# Patient Record
Sex: FEMALE | Race: WHITE | NOT HISPANIC OR LATINO | Employment: FULL TIME | URBAN - METROPOLITAN AREA
[De-identification: names, ages, dates, MRNs, and addresses within clinical notes are randomized per-mention and may not be internally consistent; named-entity substitution may affect disease eponyms.]

---

## 2022-06-28 ENCOUNTER — OFFICE VISIT (OUTPATIENT)
Dept: PHYSICAL THERAPY | Facility: MEDICAL CENTER | Age: 67
End: 2022-06-28
Payer: COMMERCIAL

## 2022-06-28 DIAGNOSIS — M79.651 ACUTE THIGH PAIN, RIGHT: Primary | ICD-10-CM

## 2022-06-28 PROCEDURE — 97161 PT EVAL LOW COMPLEX 20 MIN: CPT | Performed by: PHYSICAL THERAPIST

## 2022-06-28 PROCEDURE — 97140 MANUAL THERAPY 1/> REGIONS: CPT | Performed by: PHYSICAL THERAPIST

## 2022-06-28 PROCEDURE — 97110 THERAPEUTIC EXERCISES: CPT | Performed by: PHYSICAL THERAPIST

## 2022-06-28 NOTE — PROGRESS NOTES
PT Evaluation     Today's date: 2022  Patient name: Blanka Alvarez  : 1955  MRN: [id number]  Referring provider: Lino Villavicencio PT  Dx:   Encounter Diagnosis   Name Primary?  Acute thigh pain, right Yes                  Assessment  Assessment details: Blanka Alvarez is a 75 y/o female who presents with complaints of right thigh pain  The patient's greatest concern is pain in the morning with walking  Primary movement impairment diagnosis of pelvic malalignment, resulting in pathoanatomical symptoms of acute thigh pain, which limits her ability to perform functional activities without pain  Pt  will benefit from skilled PT services that includes manual therapy techniques to enhance tissue extensibility, neuromuscular re-education to facilitate motor control, therapeutic exercise to increase functional mobility, and modalities prn to reduce pain and inflammation  Impairments: abnormal muscle firing, abnormal or restricted ROM, activity intolerance, impaired physical strength, lacks appropriate home exercise program and pain with function  Understanding of Dx/Px/POC: good   Prognosis: good    Plan  Plan details: Patient will be seen [facility] for 4 weeks  No referral appears necessary at this time  Patient would benefit from: PT eval  Planned modality interventions: thermotherapy: hydrocollator packs  Planned therapy interventions: joint mobilization, manual therapy, patient education, strengthening, stretching, therapeutic exercise, home exercise program, graded exercise, flexibility, body mechanics training and abdominal trunk stabilization  Frequency: 1-2x/week  Duration in weeks: 4  Treatment plan discussed with: patient      Subjective Evaluation    History of Present Illness  Mechanism of injury: Patient reports that in early May, she woke up in the morning and her right thigh started to become painful with walking  She started taking Advil, which helped to alleviate the pain    She does not recall a specific MORGAN  The pain has gotten better since May, but is still bothersome at times  Patient is fairly active and likes to golf on a regular basis  She also bikes 2-3x/week  She denies back pain and LE paresthesias  Patient has a desk job as a supervisor at [employer]  She notes some soreness after sitting throughout the day and upon standing  Patient would like to be able to resume all activities without pain  Pain  Current pain ratin  At best pain ratin  At worst pain rating: 3  Quality: throbbing and sharp      Diagnostic Tests  No diagnostic tests performed  Patient Goals  Patient goals for therapy: decreased pain  Patient goal: Patient would like to be able to walk without a limp in the morning  Objective     Palpation     Right   No palpable tenderness to the rectus femoris  Hypertonic in the iliopsoas  Tenderness of the iliopsoas  Tenderness     Left Hip   No tenderness in the PSIS  Right Hip   No tenderness in the ASIS, PSIS and greater trochanter  Lumbar Screen  Lumbar range of motion within normal limits with the following exceptions: Moderate lumbar extension limitation c/ pain      Mild L lateral shift    PAROM:  Hypomobility at L4-5 on the left c/ UPAs; no pain    Neurological Testing     Sensation     Hip   Left Hip   Intact: light touch    Right Hip   Intact: light touch    Reflexes   Left   Patellar (L4): normal (2+)  Achilles (S1): normal (2+)    Right   Patellar (L4): normal (2+)  Achilles (S1): normal (2+)    Passive Range of Motion   Left Hip   Flexion: 120 degrees   Abduction: 30 degrees   External rotation (90/90): 35 degrees   Internal rotation (90/90): 20 degrees     Right Hip   Flexion: 110 degrees   Abduction: 30 degrees   External rotation (90/90): 35 degrees   Internal rotation (90/90): 15 degrees     Joint Play     Right Hip     Hypomobile in the posterior hip capsule    Strength/Myotome Testing     Left Hip   Planes of Motion   Flexion: 5  Extension: 4  Abduction: 4  Adduction: 4    Isolated Muscles   Gluteus danie: 4  Iliopsoas: 4    Right Hip   Planes of Motion   Flexion: 5  Extension: 3-  Abduction: 3+  Adduction: 4-    Isolated Muscles   Gluteus maximums: 3+  Iliopsoas: 4-    Tests     Lumbar   Negative SIJ compression, sacroiliac distraction and sacral spring   Right   Negative passive SLR, quadrant and slump test      Right Hip   Positive Ely's, Gillet's and long sit  Negative SID, FADIR, femoral nerve tension and sign of the buttock  Modified Kaushik: Positive  Additional Tests Details  (+) leg length discrepancy c/ R LE longer    Functional Assessment      Squat    Left within functional limits and right within functional limits       Posterior weight shift: good    Depth of femur height: above 90 degrees       Precautions:  N/A    Manuals 6/28            R SIJ posterior mobilization AZ Grade V            Long axis distraction L LE AZ            Posterior hip mob R AZ            Inferior glide/ IR MWM R hip AZ                         Neuro Re-Ed             TA isometrics 20x5s                                      Ther Ex             R lateral shift (L UE) 10x            Hip add 20x5s            Bridges 20x5s                         Prone quad str 3x30s            Standing hip flexor str 3x30s                                      Ther Activity                                       Gait Training                                       Modalities             GILLES Carrera, PT  6/29/2022,11:28 AM

## 2022-07-05 ENCOUNTER — OFFICE VISIT (OUTPATIENT)
Dept: PHYSICAL THERAPY | Facility: MEDICAL CENTER | Age: 67
End: 2022-07-05
Payer: COMMERCIAL

## 2022-07-05 DIAGNOSIS — M79.651 ACUTE THIGH PAIN, RIGHT: Primary | ICD-10-CM

## 2022-07-05 PROCEDURE — 97110 THERAPEUTIC EXERCISES: CPT | Performed by: PHYSICAL THERAPIST

## 2022-07-05 PROCEDURE — 97140 MANUAL THERAPY 1/> REGIONS: CPT | Performed by: PHYSICAL THERAPIST

## 2022-07-05 NOTE — PROGRESS NOTES
Daily Note     Today's date: 2022  Patient name: Benjamín Hutchins  : 1955  MRN: [id number]  Referring provider: Lisa Green, PT  Dx:   Encounter Diagnosis     ICD-10-CM    1  Acute thigh pain, right  M79 651                   Subjective:  Patient states that her R leg was bothering her when golfing this weekend and after walking a lot  Objective: See treatment diary below  Assessment:  Patient demonstrates improved pelvic alignment  She has hypomobility at L4-5 on the left  Tolerated prone press ups without increased pain  Patient demonstrated decreased pain c/ ambulation post tx  Tolerated treatment well  Patient would benefit from continued PT  Plan: Continue per plan of care        Precautions:  N/A    Manuals            R SIJ posterior mobilization AZ Grade V AZ           Long axis distraction L LE AZ AZ           Posterior hip mob R AZ AZ           Inferior glide/ IR MWM R hip AZ AZ                        Neuro Re-Ed             TA isometrics 20x5s 30x5s                                     Ther Ex             R lateral shift (L UE) 10x 10x           Hip add 20x5s 30x5s           Bridges 20x5s 20x5s           Hip abd  20x5s blue           Prone quad str 3x30s 3x30s           Standing hip flexor str 3x30s 3x30s           Hip flexor str EOT  3x30s           Prone press ups  10x           Ther Activity                                       Gait Training                                       Modalities             MH

## 2022-07-11 ENCOUNTER — OFFICE VISIT (OUTPATIENT)
Dept: PHYSICAL THERAPY | Facility: MEDICAL CENTER | Age: 67
End: 2022-07-11
Payer: COMMERCIAL

## 2022-07-11 DIAGNOSIS — M79.651 ACUTE THIGH PAIN, RIGHT: Primary | ICD-10-CM

## 2022-07-11 PROCEDURE — 97140 MANUAL THERAPY 1/> REGIONS: CPT | Performed by: PHYSICAL THERAPIST

## 2022-07-11 PROCEDURE — 97110 THERAPEUTIC EXERCISES: CPT | Performed by: PHYSICAL THERAPIST

## 2022-07-11 NOTE — PROGRESS NOTES
Daily Note     Today's date: 2022  Patient name: Sumaya Duarte  : 1955  MRN: [id number]  Referring provider: Nae Munoz, PALOMA  Dx:   Encounter Diagnosis     ICD-10-CM    1  Acute thigh pain, right  M79 651                   Subjective:  Patient states that she feels good  Objective: See treatment diary below  Assessment:  Patient presents without radiating thigh pain today and without a visible limp with walking  She demonstrates improved pelvic alignment and good tolerance to exercise progressions  Tolerated treatment well  Patient would benefit from continued PT  Plan: Continue per plan of care        Precautions:  N/A    Manuals           R SIJ posterior mobilization AZ Grade V AZ AZ          Long axis distraction L LE AZ AZ AZ          Posterior hip mob R AZ AZ AZ          Inferior glide/ IR MWM R hip AZ AZ AZ                       Neuro Re-Ed             TA isometrics 20x5s 30x5s 30x5s                                    Ther Ex             R lateral shift (L UE) 10x 10x 10x          Hip add 20x5s 30x5s 30x5s          Bridges 20x5s 20x5s 30x5s          Hip abd  20x5s blue 30x5s blue          Prone quad str 3x30s 3x30s 3x30s          Standing hip flexor str 3x30s 3x30s 3x30s          Hip flexor str EOT  3x30s 3x30s          Prone press ups  10x 10x          Ther Activity                                       Gait Training                                       Modalities

## 2022-07-14 ENCOUNTER — OFFICE VISIT (OUTPATIENT)
Dept: PHYSICAL THERAPY | Facility: MEDICAL CENTER | Age: 67
End: 2022-07-14
Payer: COMMERCIAL

## 2022-07-14 DIAGNOSIS — M79.651 ACUTE THIGH PAIN, RIGHT: Primary | ICD-10-CM

## 2022-07-14 PROCEDURE — 97110 THERAPEUTIC EXERCISES: CPT | Performed by: PHYSICAL THERAPIST

## 2022-07-14 PROCEDURE — 97140 MANUAL THERAPY 1/> REGIONS: CPT | Performed by: PHYSICAL THERAPIST

## 2022-07-14 NOTE — PROGRESS NOTES
Daily Note     Today's date: 2022  Patient name: Ion Joshi  : 1955  MRN: [id number]  Referring provider: Pia Mckeon PT  Dx:   Encounter Diagnosis     ICD-10-CM    1  Acute thigh pain, right  M79 651                   Subjective:  Patient states that she is doing much better  Objective: See treatment diary below  Assessment:  Patient demonstrates increased lumbar mobility and good pelvic alignment  She demonstrates good tolerance to exercise progressions without pain  Tolerated treatment well  Patient would benefit from continued PT  Plan: Continue per plan of care        Precautions:  N/A    Manuals          R SIJ posterior mobilization AZ Grade V AZ AZ AZ         Long axis distraction L LE AZ AZ AZ          Posterior hip mob R AZ AZ AZ AZ         Inferior glide/ IR MWM R hip AZ AZ AZ AZ                      Neuro Re-Ed             TA isometrics 20x5s 30x5s 30x5s 30x5s                                   Ther Ex             R lateral shift (L UE) 10x 10x 10x 10x         Hip add 20x5s 30x5s 30x5s 30x5s         Bridges 20x5s 20x5s 30x5s 30x5s         Hip abd  20x5s blue 30x5s blue [id number]         Prone quad str 3x30s 3x30s 3x30s 3x30s         Standing hip flexor str 3x30s 3x30s 3x30s 3x30s         Hip flexor str EOT  3x30s 3x30s 3x30s         Prone press ups  10x 10x 10x         Ther Activity                                       Gait Training                                       Modalities             MH

## 2022-07-20 ENCOUNTER — APPOINTMENT (OUTPATIENT)
Dept: PHYSICAL THERAPY | Facility: MEDICAL CENTER | Age: 67
End: 2022-07-20
Payer: COMMERCIAL

## 2022-07-22 ENCOUNTER — OFFICE VISIT (OUTPATIENT)
Dept: PHYSICAL THERAPY | Facility: MEDICAL CENTER | Age: 67
End: 2022-07-22
Payer: COMMERCIAL

## 2022-07-22 DIAGNOSIS — M79.651 ACUTE THIGH PAIN, RIGHT: Primary | ICD-10-CM

## 2022-07-22 PROCEDURE — 97110 THERAPEUTIC EXERCISES: CPT | Performed by: PHYSICAL THERAPIST

## 2022-07-22 PROCEDURE — 97140 MANUAL THERAPY 1/> REGIONS: CPT | Performed by: PHYSICAL THERAPIST

## 2022-07-22 NOTE — PROGRESS NOTES
Daily Note     Today's date: 2022  Patient name: Sonja Washington  : 1955  MRN: [id number]  Referring provider: Pavan Rios, PT  Dx:   Encounter Diagnosis     ICD-10-CM    1  Acute thigh pain, right  M79 651                   Subjective:  Patient states that she is 80% better from when we started PT  Objective: See treatment diary below  Assessment:  Patient presents without radiating pain today  It is much better overall and only comes randomly, specifically after standing for longer periods of time  She demonstrates good tolerance to exercises  Tolerated treatment well  Patient would benefit from continued PT  Plan: Continue per plan of care        Precautions:  N/A    Manuals         R SIJ posterior mobilization AZ Grade V AZ AZ AZ AZ        Long axis distraction L LE AZ AZ AZ  AZ        Posterior hip mob R AZ AZ AZ AZ         Inferior glide/ IR MWM R hip AZ AZ AZ AZ AZ                     Neuro Re-Ed             TA isometrics 20x5s 30x5s 30x5s 30x5s 30x5s                                  Ther Ex             R lateral shift (L UE) 10x 10x 10x 10x 10x        Hip add 20x5s 30x5s 30x5s 30x5s 30x5s        Bridges 20x5s 20x5s 30x5s 30x5s 30x5s        Hip abd  20x5s blue 30x5s blue 20x5s black 30x5s black        Clams     30x5s        Prone quad str 3x30s 3x30s 3x30s 3x30s 3x30s        Standing hip flexor str 3x30s 3x30s 3x30s 3x30s 3x30s        Hip flexor str EOT  3x30s 3x30s 3x30s 3x30s        Prone press ups  10x 10x 10x 10x        Ther Activity                                       Gait Training                                       Modalities             MH

## 2022-07-25 ENCOUNTER — OFFICE VISIT (OUTPATIENT)
Dept: PHYSICAL THERAPY | Facility: MEDICAL CENTER | Age: 67
End: 2022-07-25
Payer: COMMERCIAL

## 2022-07-25 DIAGNOSIS — M79.651 ACUTE THIGH PAIN, RIGHT: Primary | ICD-10-CM

## 2022-07-25 PROCEDURE — 97110 THERAPEUTIC EXERCISES: CPT | Performed by: PHYSICAL THERAPIST

## 2022-07-25 PROCEDURE — 97140 MANUAL THERAPY 1/> REGIONS: CPT | Performed by: PHYSICAL THERAPIST

## 2022-07-25 NOTE — PROGRESS NOTES
Daily Note     Today's date: 2022  Patient name: Stas White  : 1955  MRN: [id number]  Referring provider: Fuastino Sadler PT  Dx:   Encounter Diagnosis     ICD-10-CM    1  Acute thigh pain, right  M79 651                   Subjective:  Patient states that she is doing well  Objective: See treatment diary below  Assessment:  Patient presents without pelvic malalignment  She has no pain c/ weight bearing  Patient is doing well c/ exercise progressions  Tolerated treatment well  Patient would benefit from continued PT  Plan: Continue per plan of care        Precautions:  N/A    Manuals        R SIJ posterior mobilization AZ Grade V AZ AZ AZ AZ        Long axis distraction L LE AZ AZ AZ  AZ        Posterior hip mob R AZ AZ AZ AZ         Inferior glide/ IR MWM R hip AZ AZ AZ AZ AZ AZ       Iliopsoas release      AZ       Neuro Re-Ed             TA isometrics 20x5s 30x5s 30x5s 30x5s 30x5s 30x5s                                 Ther Ex             R lateral shift (L UE) 10x 10x 10x 10x 10x 10x       Hip add 20x5s 30x5s 30x5s 30x5s 30x5s 30x5s       Bridges 20x5s 20x5s 30x5s 30x5s 30x5s 30x5s       Hip abd  20x5s blue 30x5s blue 20x5s black 30x5s black 30x5s black       Clams     30x5s 30x5s       Prone quad str 3x30s 3x30s 3x30s 3x30s 3x30s 3x30s       Standing hip flexor str 3x30s 3x30s 3x30s 3x30s 3x30s 3x30s       Hip flexor str EOT  3x30s 3x30s 3x30s 3x30s 3x30s       Prone press ups  10x 10x 10x 10x 10x       Ther Activity                                       Gait Training                                       Modalities             MH

## 2022-07-27 ENCOUNTER — OFFICE VISIT (OUTPATIENT)
Dept: PHYSICAL THERAPY | Facility: MEDICAL CENTER | Age: 67
End: 2022-07-27
Payer: COMMERCIAL

## 2022-07-27 DIAGNOSIS — M79.651 ACUTE THIGH PAIN, RIGHT: Primary | ICD-10-CM

## 2022-07-27 PROCEDURE — 97140 MANUAL THERAPY 1/> REGIONS: CPT | Performed by: PHYSICAL THERAPIST

## 2022-07-27 PROCEDURE — 97110 THERAPEUTIC EXERCISES: CPT | Performed by: PHYSICAL THERAPIST

## 2022-07-27 NOTE — PROGRESS NOTES
Daily Note     Today's date: 2022  Patient name: Neyda York  : 1955  MRN: [id number]  Referring provider: Lawerance Goltz, PT  Dx:   Encounter Diagnosis     ICD-10-CM    1  Acute thigh pain, right  M79 651                   Subjective:  Patient states that she is doing well  Objective: See treatment diary below  Assessment:  Sherri French has been compliant with attending PT and home exercise program since initial eval   Sherri  has made improvements in objective data since initial evalulation and has achieved all goals  Patient reports having returned to their prior level or function  Patient provided with updated Home Exercise Program, all questions answered, verbalized understanding and agreement to plan of care  Thus it was mutually decided to discontinue this episode of care and transition to Home Exercise Program     Plan: d/c to HEP       Precautions:  N/A    Manuals       R SIJ posterior mobilization AZ Grade V AZ AZ AZ AZ        Long axis distraction L LE AZ AZ AZ  AZ        Posterior hip mob R AZ AZ AZ AZ         Inferior glide/ IR MWM R hip AZ AZ AZ AZ AZ AZ AZ      Iliopsoas release      AZ AZ      Neuro Re-Ed             TA isometrics 20x5s 30x5s 30x5s 30x5s 30x5s 30x5s 30x5s                                Ther Ex             R lateral shift (L UE) 10x 10x 10x 10x 10x 10x 10x      Hip add 20x5s 30x5s 30x5s 30x5s 30x5s 30x5s 30x5s      Bridges 20x5s 20x5s 30x5s 30x5s 30x5s 30x5s 30x5s      Hip abd  20x5s blue 30x5s blue 20x5s black 30x5s black 30x5s black [id number]      Clams     30x5s 30x5s 30x5s      Prone quad str 3x30s 3x30s 3x30s 3x30s 3x30s 3x30s 3x30s      Standing hip flexor str 3x30s 3x30s 3x30s 3x30s 3x30s 3x30s 3x30s      Hip flexor str EOT  3x30s 3x30s 3x30s 3x30s 3x30s 3x30s      Prone press ups  10x 10x 10x 10x 10x 10x      Ther Activity                                       Gait Training                                       Modalities [id number]

## 2022-07-28 ENCOUNTER — APPOINTMENT (OUTPATIENT)
Dept: PHYSICAL THERAPY | Facility: MEDICAL CENTER | Age: 67
End: 2022-07-28
Payer: COMMERCIAL

## 2023-01-20 ENCOUNTER — TELEPHONE (OUTPATIENT)
Dept: FAMILY MEDICINE CLINIC | Facility: CLINIC | Age: 68
End: 2023-01-20

## 2023-02-01 NOTE — TELEPHONE ENCOUNTER
01/31/23 10:03 PM     The office's request has been received, reviewed, and the patient chart updated  The PCP has successfully been removed with a patient attribution note  This message will now be completed      Thank you  Sheridan Cooper 0

## 2024-08-30 ENCOUNTER — APPOINTMENT (OUTPATIENT)
Dept: RADIOLOGY | Facility: MEDICAL CENTER | Age: 69
End: 2024-08-30
Payer: MEDICARE

## 2024-08-30 ENCOUNTER — OFFICE VISIT (OUTPATIENT)
Dept: OBGYN CLINIC | Facility: MEDICAL CENTER | Age: 69
End: 2024-08-30
Payer: MEDICARE

## 2024-08-30 VITALS
WEIGHT: 174.4 LBS | BODY MASS INDEX: 26.43 KG/M2 | DIASTOLIC BLOOD PRESSURE: 78 MMHG | HEIGHT: 68 IN | HEART RATE: 72 BPM | SYSTOLIC BLOOD PRESSURE: 127 MMHG

## 2024-08-30 DIAGNOSIS — M16.11 PRIMARY OSTEOARTHRITIS OF ONE HIP, RIGHT: Primary | ICD-10-CM

## 2024-08-30 DIAGNOSIS — M79.651 RIGHT THIGH PAIN: ICD-10-CM

## 2024-08-30 DIAGNOSIS — M25.551 PAIN IN RIGHT HIP: ICD-10-CM

## 2024-08-30 PROCEDURE — 99204 OFFICE O/P NEW MOD 45 MIN: CPT | Performed by: ORTHOPAEDIC SURGERY

## 2024-08-30 PROCEDURE — 73552 X-RAY EXAM OF FEMUR 2/>: CPT

## 2024-08-30 PROCEDURE — 73502 X-RAY EXAM HIP UNI 2-3 VIEWS: CPT

## 2024-08-30 NOTE — PROGRESS NOTES
Assessment & Plan     1. Primary osteoarthritis of one hip, right    2. Right thigh pain      Orders Placed This Encounter   Procedures    XR femur 2 vw right    XR hip/pelv 2-3 vws right if performed    Ambulatory Referral to Physical Therapy     Patient is severe right hip osteoarthritis.  I discussed with patient that her medial thigh pain is stemming from her right hip osteoarthritis.  Physical therapy referral was given to patient at today's visit to work on range of motion and strengthening of her right hip.  Can take Tylenol 1,000mg by mouth every 8 hours as needed for pain.  Do not exceed 3,000mg per day.    Continue OTC NSAIDs as needed for pain  Continue topical gels, ice, and heat as needed  Patient may continue activity as tolerated  Patient will follow-up on an as-needed basis if her symptoms worsen.      Return if symptoms worsen or fail to improve.    I answered all of the patient's questions during the visit and provided education of the patient's condition during the visit.  The patient verbalized understanding of the information given and agrees with the plan.  This note was dictated using Inside software.  It may contain errors including improperly dictated words.  Please contact physician directly for any questions.    History of Present Illness   Chief complaint:   Chief Complaint   Patient presents with    Right Thigh - Pain       HPI: Sherri French is a 69 y.o. female that c/o right hip pain.    Length of time hip pain has been present: 2 years  Any falls or trauma associated with onset of pain: no elia   Location of pain: right medial thigh  Does the pain radiate?: no  Intermittent or constant: Intermittent  Description of pain: Achy  Aggravating factors: weightbearing activities   Instability?: no   Pain medication that has been tried: IBU  Topical mediation that has been tried: yes  Has heat/ice been tried: yes  Can NSAIDs be taken?  If not why?: yes  Has PT or home exercises been tried?:  "has done PT for this in the past. 2 years ago with mild relief.   Have steroid injections been tried?  no  Any history of surgery on that hip?:  no     ROS:    See HPI for musculoskeletal review.   All other systems reviewed are negative     Historical Information   No past medical history on file.  No past surgical history on file.  Social History   Social History     Substance and Sexual Activity   Alcohol Use Not on file     Social History     Substance and Sexual Activity   Drug Use Not on file     Social History     Tobacco Use   Smoking Status Not on file   Smokeless Tobacco Not on file     Family History: No family history on file.    No current outpatient medications on file prior to visit.     No current facility-administered medications on file prior to visit.     No Known Allergies    Objective   Vitals: Blood pressure 127/78, pulse 72, height 5' 8\" (1.727 m), weight 79.1 kg (174 lb 6.4 oz).,Body mass index is 26.52 kg/m².    PE:  AAOx 3  WDWN  Hearing intact, no drainage from eyes  Regular rate  no audible wheezing  no abdominal distension  LE compartments soft, skin intact    right hip:   No dislocation/deformity  pos. [facility]  ROM: 100 flexion, ER 50, IR 0-5   pos. Agustin Test  Neg. Impingement test  No TTP over greater trochanter  Abduction: 5/5  Neg. Khoi's test  No TTP over SIJ    rightLE:    Sensation grossly intact   Palpable 2+ pulse  AT/GS intact    Back:    No TTP over lumbar spinous processes, paraspinal musculature  SLR: Neg.     Imaging Studies: I have personally reviewed pertinent films in PACS  righthip: Severe right hip osteoarthritis.       Scribe Attestation      I,:  Manav Cooper am acting as a scribe while in the presence of the attending physician.:       I,:  Ute Caro DO personally performed the services described in this documentation    as scribed in my presence.:                "

## 2024-09-25 ENCOUNTER — EVALUATION (OUTPATIENT)
Dept: PHYSICAL THERAPY | Facility: CLINIC | Age: 69
End: 2024-09-25
Payer: MEDICARE

## 2024-09-25 DIAGNOSIS — M16.11 PRIMARY OSTEOARTHRITIS OF ONE HIP, RIGHT: Primary | ICD-10-CM

## 2024-09-25 PROCEDURE — 97112 NEUROMUSCULAR REEDUCATION: CPT

## 2024-09-25 PROCEDURE — 97161 PT EVAL LOW COMPLEX 20 MIN: CPT

## 2024-09-25 PROCEDURE — 97110 THERAPEUTIC EXERCISES: CPT

## 2024-09-25 NOTE — PROGRESS NOTES
PT Evaluation     Today's date: 2024  Patient name: Sherri French  : 1955  MRN: [id number]  Referring provider: Ute Caro,*  Dx:   Encounter Diagnosis     ICD-10-CM    1. Primary osteoarthritis of one hip, right  M16.11 Ambulatory Referral to Physical Therapy          Start Time: 1500  Stop Time: 1540  Total time in clinic (min): 40 minutes    Assessment  Impairments: abnormal muscle firing, abnormal or restricted ROM, activity intolerance, impaired physical strength, lacks appropriate home exercise program, pain with function and poor body mechanics  Symptom irritability: moderate    Assessment details: Pt is a 69 y.o. year old female presenting to physical therapy for Primary osteoarthritis of one hip, right. She presents with the following impairments: decreased strength and endurance in all b/l hip musculature with right hip being weaker in all movements compared to left side, poor activation and endurance of TA and core musculature, improper firing of multifidi, gait dysfunction, and poor squatting mechanics affecting her function with prolonged walking, standing, carrying, lifting, squatting, navigating stairs, and any other prolonged weight barring tasks. Pt will benefit from skilled physical therapy to address functional limitations noted in evaluation and meet patient goals.   Understanding of Dx/Px/POC: good     Prognosis: good    Goals  ST. Pt will be independent with HEP.  2. Pt will improve gait and squatting mechanics to decreased deficits seen on initial evaluation.     3. Pt will improve FOTO score from baseline set during initial evaluation  LT. Pt will be able to walk for 2+ hours with no increase in symptoms  2. Pt will improve right hip strength grossly by 2 grades or more  3. Pt will be able to navigate 5 flights of stairs without increase in symptoms  4. Pt will reach FOTO goal set by ana lilia during initial evaluation     Plan  Patient would benefit from: PT meetal  "and skilled physical therapy  Planned modality interventions: biofeedback, manual electrical stimulation, microcurrent electrical stimulation, TENS, electrical stimulation/Russian stimulation, thermotherapy: hydrocollator packs, cryotherapy and unattended electrical stimulation    Planned therapy interventions: abdominal trunk stabilization, joint mobilization, manual therapy, massage, ADL retraining, neuromuscular re-education, body mechanics training, patient education, postural training, strengthening, stretching, therapeutic activities, therapeutic exercise, flexibility, functional ROM exercises and home exercise program    Frequency: 2x week  Duration in weeks: 6  Treatment plan discussed with: patient      Subjective Evaluation    History of Present Illness  Mechanism of injury: Pt is a 69 y.o. female presenting with chronic right hip pain. Pt reports that this pain has been present for 2 years now, with no MORGAN. Pt noted their pain is usually located globally throughout her hip with most of her pain in her anterior medial right thigh. Pt received X-rays on her right hip and pelvis which showed \"Severe end-stage degenerative changes of the right hip as detailed above.Secondary flattening of the femoral head.Underlying avascular necrosis should be considered.Mild superior migration of the femoral head secondary to degenerative erosion and flattening of the superior acetabular margin.\" She also received x-rays of her right femur which showed \"Advanced degenerative arthritis right hip with obliteration of the superior femoral acetabular joint space, subchondral cystic change and sclerosis on both sides of the joint space, and superior lateral subluxation of the femoral head relative to the acetabulum. Mild flattening deformity of the right femoral head articular surface.\" Pt reports that prolonged walking, standing, carrying, lifting, squatting, navigating stairs, and any other prolonged weight barring tasks is " what causes them the most pain and are the most difficult movements for them to perfrom. Pt reports that medication helps relieve the pain. Pt stated that their main goals for therapy are pain reduction and improved function with prolonged walking, standing, carrying, lifting, squatting, navigating stairs, and any other prolonged weight barring tasks.   Quality of life: good    Patient Goals  Patient goals for therapy: decreased edema, decreased pain, improved balance, increased motion, increased strength and independence with ADLs/IADLs    Pain  Current pain ratin  At best pain ratin  At worst pain ratin  Quality: dull ache  Relieving factors: medications  Aggravating factors: standing, walking and stair climbing          Objective     Active Range of Motion   Left Hip   Flexion: WFL  Extension: WFL  Abduction: WFL  External rotation (90/90): WFL  Internal rotation (90/90): WFL    Right Hip   Flexion: WFL  Extension: WFL  External rotation (90/90): WFL    Strength/Myotome Testing     Left Hip   Planes of Motion   Flexion: 4-  Extension: 4-  Abduction: 4-  External rotation: 4+  Internal rotation: 4+    Right Hip   Planes of Motion   Flexion: 3+  Extension: 3+  Abduction: 3+  External rotation: 4  Internal rotation: 4    Left Knee   Flexion: 5  Extension: 5    Right Knee   Flexion: 5  Extension: 5    Left Ankle/Foot   Dorsiflexion: 5  Plantar flexion: 5    Right Ankle/Foot   Dorsiflexion: 5  Plantar flexion: 5    Tests     Lumbar     Left   Negative crossed SLR, passive SLR, quadrant and slump test.     Right   Positive quadrant.   Negative crossed SLR, passive SLR and slump test.     Left Hip   Negative SID, FADIR, piriformis and scour.   SLR: Negative.     Right Hip   Positive scour.   Negative SID, FADIR and piriformis.   SLR: Negative.     General Comments:      Hip Comments   Gait: weight shifted to the left, inconsistent and asymmetric step length, right hip scissoring and crossing mid  "line  Squatting: poor depth, weight forward, heels leave the ground  Increased tension in right hip flexor and ITB  Poor activation of core and TA musculature  Improper firing on multifidi             Precautions: Falls Risk      Date 9/25            Visit # IE            FOTO IE             Re-eval IE               Manuals 9/25            LE Stretching                                                    Neuro Re-Ed 9/25            TA activation 10x5\"            Bridges 15x3\" GTB            Clamshells 10x3\" ea GTB            Hamstring str             Standing Ball Crush March             Hamstring Curl             Birdbog             Deadbug             Split Squat             Ther Ex 9/25            Bike             Hip Flexor Str 3x30\" R            ITB Str             SLR 15x3\" ea            S/L Hip Abd 15x3\" ea            Side Plank             Pallof Press             DL Leg Press             SL Leg Press             Side-Stepping             Monster Walks                          Ther Activity 9/25            Squatting             Lateral Step Ups             Step-Ups             Gait Training 9/25                                      Modalities 9/25                                              "

## 2024-10-02 ENCOUNTER — OFFICE VISIT (OUTPATIENT)
Dept: PHYSICAL THERAPY | Facility: CLINIC | Age: 69
End: 2024-10-02
Payer: MEDICARE

## 2024-10-02 DIAGNOSIS — M16.11 PRIMARY OSTEOARTHRITIS OF ONE HIP, RIGHT: Primary | ICD-10-CM

## 2024-10-02 PROCEDURE — 97110 THERAPEUTIC EXERCISES: CPT

## 2024-10-02 PROCEDURE — 97112 NEUROMUSCULAR REEDUCATION: CPT

## 2024-10-02 NOTE — PROGRESS NOTES
"Daily Note     Today's date: 10/2/2024  Patient name: Sherri French  : 1955  MRN: [id number]  Referring provider: Ute Caro,*  Dx:   Encounter Diagnosis     ICD-10-CM    1. Primary osteoarthritis of one hip, right  M16.11           Start Time: 1520  Stop Time: 1600  Total time in clinic (min): 40 minutes    Subjective: Pt reports that her hip is more sore coming into today's session than the intial evaluation, but she notes it is likely due to muscle soreness for being consistent with performing HEP      Objective: See treatment diary below      Assessment: Pt tolerated treatment well. Added leg press, both DL and SL, side-stepping, monster walks, and 3-way hip kicks  to improve the strength and endurance of hip, LE, and posterior chain musculature globally. Added pallof press, deadbugs, and standing ball crush marching to improve the activation and endurance of multifidi, TA, and core musculature. Added hamstring stretch to reduce tension in associated musculature. Patient exhibited good technique with therapeutic exercises and would benefit from continued PT      Plan: Continue per plan of care.  Progress treatment as tolerated.       Precautions: Falls Risk      Date 9/25 10/2           Visit # IE 2           FOTO IE             Re-eval IE               Manuals 9/25 10/2           LE Stretching                                                    Neuro Re-Ed 9/25 10/2           TA activation 10x5\"            Bridges 15x3\" GTB 20x 3\" ball/belt           Clamshells 10x3\" ea GTB            Hamstring str  3x30\" R           Standing Ball Crush March  30x ea           Hamstring Curl             Birdbog  15x3\" ea           Deadbug             Split Squat             Ther Ex 9/25 10/2           Bike  6'           Hip Flexor Str 3x30\" R            ITB Str             SLR 15x3\" ea            S/L Hip Abd 15x3\" ea            Side Plank             Pallof Press  15x3\" 10#           DL Leg Press  3x10 85#     "       SL Leg Press  3x10 45# R           Side-Stepping  5 laps GTB           Monster Walks  5 laps GTB           3-way hip kicks  15x GTB ea           Ther Activity 9/25 10/2           Squatting             Lateral Step Ups             Step-Ups             Gait Training 9/25 10/2                                     Modalities 9/25 10/2

## 2024-10-04 ENCOUNTER — OFFICE VISIT (OUTPATIENT)
Dept: PHYSICAL THERAPY | Facility: CLINIC | Age: 69
End: 2024-10-04
Payer: MEDICARE

## 2024-10-04 DIAGNOSIS — M16.11 PRIMARY OSTEOARTHRITIS OF ONE HIP, RIGHT: Primary | ICD-10-CM

## 2024-10-04 PROCEDURE — 97110 THERAPEUTIC EXERCISES: CPT

## 2024-10-04 PROCEDURE — 97112 NEUROMUSCULAR REEDUCATION: CPT

## 2024-10-04 NOTE — PROGRESS NOTES
"Daily Note     Today's date: 10/4/2024  Patient name: Sherri French  : 1955  MRN: [id number]  Referring provider: Ute Caro,*  Dx:   Encounter Diagnosis     ICD-10-CM    1. Primary osteoarthritis of one hip, right  M16.11                      Subjective: General muscle soreness following progressions last session but nothing significant.       Objective: See treatment diary below      Assessment: Pt tolerated treatment well.  Pleased with last treatment and requesting same treatment plan. Demonstrates good recall of exercise requiring minimal cues. Patient exhibited good technique with therapeutic exercises and would benefit from continued PT      Plan: Continue per plan of care.  Progress treatment as tolerated.       Precautions: Falls Risk      Date 9/25 10/2 10/4          Visit # IE 2 3          FOTO IE             Re-eval IE               Manuals 9/25 10/2 10/4          LE Stretching                                                    Neuro Re-Ed 9/25 10/2 10/4          TA activation 10x5\"            Bridges 15x3\" GTB 20x 3\" ball/belt 20x 3\" ball/belt          Clamshells 10x3\" ea GTB            Hamstring str  3x30\" R 3x30\" R          Standing Ball Crush x ea 30 ea.           Hamstring Curl             Birdbog  15x3\" ea           Deadbug   15x3\" ea          Split Squat             Ther Ex 9/25 10/2 10/4          Bike  6' L1 6'          Hip Flexor Str 3x30\" R            ITB Str             SLR 15x3\" ea            S/L Hip Abd 15x3\" ea            Side Plank             Pallof Press  15x3\" 10# 15x3\"  10#          DL Leg Press  3x10 85# 3x10  85#          SL Leg Press  3x10 45# R 3x10  45# R          Side-Stepping  5 laps GTB 5 laps  GTB          Monster Walks  5 laps GTB 5 laps  GTB          3-way hip kicks  15x GTB ea 15x  GTB ea          Ther Activity 9/25 10/2 10/4          Squatting             Lateral Step Ups             Step-Ups             Gait Training 9/25 10/2 10/4              "                       Modalities 9/25 10/2 10/4

## 2024-10-07 ENCOUNTER — OFFICE VISIT (OUTPATIENT)
Dept: PHYSICAL THERAPY | Facility: CLINIC | Age: 69
End: 2024-10-07
Payer: MEDICARE

## 2024-10-07 DIAGNOSIS — M16.11 PRIMARY OSTEOARTHRITIS OF ONE HIP, RIGHT: Primary | ICD-10-CM

## 2024-10-07 PROCEDURE — 97110 THERAPEUTIC EXERCISES: CPT

## 2024-10-07 PROCEDURE — 97530 THERAPEUTIC ACTIVITIES: CPT

## 2024-10-07 NOTE — PROGRESS NOTES
"Daily Note     Today's date: 10/7/2024  Patient name: Sherri French  : 1955  MRN: [id number]  Referring provider: Ute Caro,*  Dx:   Encounter Diagnosis     ICD-10-CM    1. Primary osteoarthritis of one hip, right  M16.11           Start Time: 1050  Stop Time: 1130  Total time in clinic (min): 40 minutes    Subjective: Pt reports that her hip is feeling good coming into today's session, with minimal pain reported, but slight muscular discomfort in her other LE musculature.      Objective: See treatment diary below      Assessment: Pt tolerated treatment well. Added and progressed several exercises during today's session to conitune to improve the functional tolerance and performance with ADLs requiring the use of LE musculature, as well as globally improving the strength and endurance of hip, LE, and posterior chain musculature. Added squatting, step ups, and lateral step ups. Progressed DL leg press from 85# to 105# and SL leg press from 45# to 66#. Progressed pallof press from 10# to 12.5#. Pt needed moderate cueing for form corrections, but once pt was cued she was able to complete all sets and reps with proper form. Patient exhibited good technique with therapeutic exercises and would benefit from continued PT      Plan: Continue per plan of care.  Progress treatment as tolerated.       Precautions: Falls Risk      Date 9/25 10/2 10/4 10/7         Visit # IE 2 3 4         FOTO IE             Re-eval IE               Manuals 9/25 10/2 10/4 10/7         LE Stretching                                                    Neuro Re-Ed 9/25 10/2 10/4 10/7         TA activation 10x5\"            Bridges 15x3\" GTB 20x 3\" ball/belt 20x 3\" ball/belt          Clamshells 10x3\" ea GTB            Slantboard    5x20\"          Hamstring str  3x30\" R 3x30\" R 3x30\" seated R         Standing Ball Crush  ea 30 ea.           Hamstring Curl             Birdbog  15x3\" ea           Deadbug   15x3\" ea        " "  Split Squat             Ther Ex 9/25 10/2 10/4 10/7         Bike  6' L1 6' 6'         Hip Flexor Str 3x30\" R            ITB Str             SLR 15x3\" ea            S/L Hip Abd 15x3\" ea            Side Plank             Pallof Press  15x3\" 10# 15x3\"  10# 15x3\" 12#         DL Leg Press  3x10 85# 3x10  85# 3x10 105#          SL Leg Press  3x10 45# R 3x10  45# R 3x10 55# R         Side-Stepping  5 laps GTB 5 laps  GTB 5 laps GTB         Monster Walks  5 laps GTB 5 laps  GTB 5 laps GTB         3-way hip kicks  15x GTB ea 15x  GTB ea 15x GTB         Ther Activity 9/25 10/2 10/4 10/7         Squatting    30x          Lateral Step Ups    20x ea 1R         Step-Ups    20x ea 1R         Gait Training 9/25 10/2 10/4 10/7                                   Modalities 9/25 10/2 10/4 10/7                                             "

## 2024-10-09 ENCOUNTER — APPOINTMENT (OUTPATIENT)
Dept: PHYSICAL THERAPY | Facility: CLINIC | Age: 69
End: 2024-10-09
Payer: MEDICARE

## 2024-10-11 ENCOUNTER — OFFICE VISIT (OUTPATIENT)
Dept: PHYSICAL THERAPY | Facility: CLINIC | Age: 69
End: 2024-10-11
Payer: MEDICARE

## 2024-10-11 DIAGNOSIS — M16.11 PRIMARY OSTEOARTHRITIS OF ONE HIP, RIGHT: Primary | ICD-10-CM

## 2024-10-11 PROCEDURE — 97530 THERAPEUTIC ACTIVITIES: CPT

## 2024-10-11 PROCEDURE — 97110 THERAPEUTIC EXERCISES: CPT

## 2024-10-11 NOTE — PROGRESS NOTES
"Daily Note     Today's date: 10/11/2024  Patient name: Sherri French  : 1955  MRN: [id number]  Referring provider: Ute Caro,*  Dx:   Encounter Diagnosis     ICD-10-CM    1. Primary osteoarthritis of one hip, right  M16.11                      Subjective: Pt reports that she is doing pretty good overall with soreness present in both LE's.       Objective: See treatment diary below      Assessment: Pt tolerated treatment well.  Pt began on the bike as an active warmup. Pt needed moderate cueing for form corrections, but once pt was cued she was able to complete all sets and reps with proper form. Patient exhibited good technique with therapeutic exercises and would benefit from continued PT      Plan: Continue per plan of care.  Progress treatment as tolerated.       Precautions: Falls Risk      Date 9/25 10/2 10/4 10/7 10/11        Visit # IE 2 3 4 5        FOTO IE     yes        Re-eval IE               Manuals 9/25 10/2 10/4 10/7 10/11        LE Stretching                                                    Neuro Re-Ed 9/25 10/2 10/4 10/7 10/11        TA activation 10x5\"            Bridges 15x3\" GTB 20x 3\" ball/belt 20x 3\" ball/belt          Clamshells 10x3\" ea GTB            Slantboard    5x20\"  5x20\"        Hamstring str  3x30\" R 3x30\" R 3x30\" seated R 3x30\" R        Standing Ball Crush x ea 30 ea.           Hamstring Curl             Birdbog  15x3\" ea           Deadbug   15x3\" ea          Split Squat             Ther Ex 9/25 10/2 10/4 10/7 10/11        Bike  6' L1 6' 6' 6'        Hip Flexor Str 3x30\" R            ITB Str             SLR 15x3\" ea            S/L Hip Abd 15x3\" ea            Side Plank             Pallof Press  15x3\" 10# 15x3\"  10# 15x3\" 12# 15x3\" 12#        DL Leg Press  3x10 85# 3x10  85# 3x10 105#  3x10 105#        SL Leg Press  3x10 45# R 3x10  45# R 3x10 55# R 3x10 55# R        Side-Stepping  5 laps GTB 5 laps  GTB 5 laps GTB 5 laps GTB        Monster Walks  5 laps " GTB 5 laps  GTB 5 laps GTB 5 laps GTB        3-way hip kicks  15x GTB ea 15x  GTB ea 15x GTB 15x GTB        Ther Activity 9/25 10/2 10/4 10/7 10/11        Squatting    30x  30x        Lateral Step Ups    20x ea 1R 20x ea 1R        Step-Ups    20x ea 1R 20x ea 1R        Gait Training 9/25 10/2 10/4 10/7 10/11                                  Modalities 9/25 10/2 10/4 10/7 10/11

## 2024-10-14 ENCOUNTER — OFFICE VISIT (OUTPATIENT)
Dept: PHYSICAL THERAPY | Facility: CLINIC | Age: 69
End: 2024-10-14
Payer: MEDICARE

## 2024-10-14 DIAGNOSIS — M16.11 PRIMARY OSTEOARTHRITIS OF ONE HIP, RIGHT: Primary | ICD-10-CM

## 2024-10-14 PROCEDURE — 97110 THERAPEUTIC EXERCISES: CPT | Performed by: PHYSICAL THERAPIST

## 2024-10-14 NOTE — PROGRESS NOTES
"Daily Note     Today's date: 10/14/2024  Patient name: Sherri French  : 1955  MRN: [id number]  Referring provider: Ute Caro,*  Dx:   Encounter Diagnosis     ICD-10-CM    1. Primary osteoarthritis of one hip, right  M16.11                      Subjective: Pt reports her legs are tired and a little sore today but no pain.      Objective: See treatment diary below      Assessment:  Pt is challenged with resuming table exercises today with focus on glute activation.  SHe has lots of hip soreness and fatigue following s/l hip abd and prone hip ext.  She requires cues for proper form with standing hip abd and side stepping.  Patient demonstrated fatigue post treatment, exhibited good technique with therapeutic exercises, and would benefit from continued PT      Plan: Continue per plan of care.  Progress treatment as tolerated.         Precautions: Falls Risk      Date 9/25 10/2 10/4 10/7 10/11 10/14       Visit # IE 2 3 4 5 6       FOTO IE     yes        Re-eval IE               Manuals 9/25 10/2 10/4 10/7 10/11 10/14       LE Stretching                                                    Neuro Re-Ed 9/25 10/2 10/4 10/7 10/11 10/14       TA activation 10x5\"            Bridges 15x3\" GTB 20x 3\" ball/belt 20x 3\" ball/belt   2x10       Clamshells 10x3\" ea GTB            Slantboard    5x20\"  5x20\"        Hamstring str  3x30\" R 3x30\" R 3x30\" seated R 3x30\" R        Standing Ball Crush x ea 30 ea.           Hamstring Curl             Birdbog  15x3\" ea           Deadbug   15x3\" ea          Split Squat             Ther Ex 9/25 10/2 10/4 10/7 10/11 10/14       Bike  6' L1 6' 6' 6' 8'       Hip Flexor Str 3x30\" R            ITB Str             SLR 15x3\" ea     2x15       S/L Hip Abd 15x3\" ea     2x15       Prone hip ext      2x10       Side Plank             Pallof Press  15x3\" 10# 15x3\"  10# 15x3\" 12# 15x3\" 12#        DL Leg Press  3x10 85# 3x10  85# 3x10 105#  3x10 105# 2x15 125#       SL Leg Press  " 3x10 45# R 3x10  45# R 3x10 55# R 3x10 55# R 2x15 65# R       Side-Stepping  5 laps GTB 5 laps  GTB 5 laps GTB 5 laps GTB 5 laps GTB       Monster Walks  5 laps GTB 5 laps  GTB 5 laps GTB 5 laps GTB 5 laps GTB       3-way hip kicks  15x GTB ea 15x  GTB ea 15x GTB 15x GTB 20x GTB       Ther Activity 9/25 10/2 10/4 10/7 10/11 10/14       Squatting    30x  30x 2x15 STS hi-lo       Lateral Step Ups    20x ea 1R 20x ea 1R        Step-Ups    20x ea 1R 20x ea 1R        Gait Training 9/25 10/2 10/4 10/7 10/11                                  Modalities 9/25 10/2 10/4 10/7 10/11

## 2024-10-16 ENCOUNTER — OFFICE VISIT (OUTPATIENT)
Dept: PHYSICAL THERAPY | Facility: CLINIC | Age: 69
End: 2024-10-16
Payer: MEDICARE

## 2024-10-16 DIAGNOSIS — M16.11 PRIMARY OSTEOARTHRITIS OF ONE HIP, RIGHT: Primary | ICD-10-CM

## 2024-10-16 PROCEDURE — 97530 THERAPEUTIC ACTIVITIES: CPT

## 2024-10-16 PROCEDURE — 97110 THERAPEUTIC EXERCISES: CPT

## 2024-10-16 NOTE — PROGRESS NOTES
"Daily Note     Today's date: 10/16/2024  Patient name: Sherri French  : 1955  MRN: [id number]  Referring provider: Ute Caro,*  Dx:   Encounter Diagnosis     ICD-10-CM    1. Primary osteoarthritis of one hip, right  M16.11           Start Time: 1215  Stop Time: 1255  Total time in clinic (min): 40 minutes    Subjective: Pt reports that she is feeling better overall, with just slight increased soreness post session last visit.       Objective: See treatment diary below      Assessment: Pt tolerated treatment well. Pt showed improved strength, endurance, and tolerance to activity during today's session due to several progressions being made and pt being able to complete them all with proper from and appropriate levels of fatigue post exercises. Progressed side-stepping, monster walks, and 3-way hip kicks from GTB to BTB to continue to challenge the strength and endurance of LE and posterior chain musculature. Progressed DL leg press from 125# to 135# and SL leg press from 65# to 75#. Patient exhibited good technique with therapeutic exercises and would benefit from continued PT      Plan: Continue per plan of care.  Progress treatment as tolerated.       Precautions: Falls Risk      Date 9/25 10/2 10/4 10/7 10/11 10/14 10/16      Visit # IE 2 3 4 5 6 7      FOTO IE     yes        Re-eval IE               Manuals 9/25 10/2 10/4 10/7 10/11 10/14 10/16      LE Stretching                                                    Neuro Re-Ed 9/25 10/2 10/4 10/7 10/11 10/14 101/6      TA activation 10x5\"            Bridges 15x3\" GTB 20x 3\" ball/belt 20x 3\" ball/belt   2x10       Clamshells 10x3\" ea GTB            Slantboard    5x20\"  5x20\"        Hamstring str  3x30\" R 3x30\" R 3x30\" seated R 3x30\" R        Standing Ball Crush x ea 30 ea.           Hamstring Curl             Birdbog  15x3\" ea           Deadbug   15x3\" ea          Split Squat             Ther Ex 9/25 10/2 10/4 10/7 10/11 10/14 106    " "  Bike  6' L1 6' 6' 6' 8' 8' lvl 3      Hip Flexor Str 3x30\" R            ITB Str             SLR 15x3\" ea     2x15       S/L Hip Abd 15x3\" ea     2x15       Prone hip ext      2x10       Side Plank             Pallof Press  15x3\" 10# 15x3\"  10# 15x3\" 12# 15x3\" 12#        Hamstring Curl       20x ea BTB      Marching       20x ea BTB      DL Leg Press  3x10 85# 3x10  85# 3x10 105#  3x10 105# 2x15 125# 30x 135#      SL Leg Press  3x10 45# R 3x10  45# R 3x10 55# R 3x10 55# R 2x15 65# R 30x R 75#      Side-Stepping  5 laps GTB 5 laps  GTB 5 laps GTB 5 laps GTB 5 laps GTB 5 laps BTB      Monster Walks  5 laps GTB 5 laps  GTB 5 laps GTB 5 laps GTB 5 laps GTB 5 laps BTB      3-way hip kicks  15x GTB ea 15x  GTB ea 15x GTB 15x GTB 20x GTB 20x ea BTB      Ther Activity 9/25 10/2 10/4 10/7 10/11 10/14 10/16      Squatting    30x  30x 2x15 STS hi-lo 30x STS      Lateral Step Ups    20x ea 1R 20x ea 1R  20x ea 2R      Step-Ups    20x ea 1R 20x ea 1R  20x ea 2R      Gait Training 9/25 10/2 10/4 10/7 10/11                                  Modalities 9/25 10/2 10/4 10/7 10/11                                                "

## 2024-10-17 ENCOUNTER — APPOINTMENT (OUTPATIENT)
Dept: PHYSICAL THERAPY | Facility: CLINIC | Age: 69
End: 2024-10-17
Payer: MEDICARE

## 2024-10-24 ENCOUNTER — OFFICE VISIT (OUTPATIENT)
Dept: PHYSICAL THERAPY | Facility: CLINIC | Age: 69
End: 2024-10-24
Payer: MEDICARE

## 2024-10-24 DIAGNOSIS — M16.11 PRIMARY OSTEOARTHRITIS OF ONE HIP, RIGHT: Primary | ICD-10-CM

## 2024-10-24 PROCEDURE — 97530 THERAPEUTIC ACTIVITIES: CPT | Performed by: PHYSICAL THERAPY ASSISTANT

## 2024-10-24 PROCEDURE — 97110 THERAPEUTIC EXERCISES: CPT | Performed by: PHYSICAL THERAPY ASSISTANT

## 2024-10-24 NOTE — PROGRESS NOTES
"Daily Note     Today's date: 10/24/2024  Patient name: Sherri French  : 1955  MRN: [id number]  Referring provider: Ute Caro,*  Dx:   Encounter Diagnosis     ICD-10-CM    1. Primary osteoarthritis of one hip, right  M16.11                      Subjective: pt reports she is doing well.  Statse she is slightly more sore than usual due to having to take care of her sister the past few days following a THR.      Objective: See treatment diary below      Assessment: Pt tolerated treatment well. Patient demonstrated fatigue post treatment, exhibited good technique with therapeutic exercises, and would benefit from continued PT      Plan: Continue per plan of care.  Progress treatment as tolerated.       Precautions: Falls Risk      Date 9/25 10/2 10/4 10/7 10/11 10/14 10/16 10/24     Visit # IE 2 3 4 5 6 7 8     FOTO IE     yes        Re-eval IE               Manuals 9/25 10/2 10/4 10/7 10/11 10/14 10/16 10/24     LE Stretching                                                    Neuro Re-Ed 9/25 10/2 10/4 10/7 10/11 10/14 101/6 10/24     TA activation 10x5\"            Bridges 15x3\" GTB 20x 3\" ball/belt 20x 3\" ball/belt   2x10       Clamshells 10x3\" ea GTB            Slantboard    5x20\"  5x20\"        Hamstring str  3x30\" R 3x30\" R 3x30\" seated R 3x30\" R        Standing Ball Crush March  30x ea 30 ea.           Hamstring Curl             Birdbog  15x3\" ea           Deadbug   15x3\" ea          Split Squat             Ther Ex 9/25 10/2 10/4 10/7 10/11 10/14 10/6 10/24     Bike  6' L1 6' 6' 6' 8' 8' lvl 3 8' L3     Hip Flexor Str 3x30\" R            ITB Str             SLR 15x3\" ea     2x15       S/L Hip Abd 15x3\" ea     2x15       Prone hip ext      2x10       Side Plank             Pallof Press  15x3\" 10# 15x3\"  10# 15x3\" 12# 15x3\" 12#        Hamstring Curl       20x ea BTB 2x10 BTB     Marching       20x ea BTB 2x10 BTB     DL Leg Press  3x10 85# 3x10  85# 3x10 105#  3x10 105# 2x15 125# 30x 135# 3x10 135#  "    SL Leg Press  3x10 45# R 3x10  45# R 3x10 55# R 3x10 55# R 2x15 65# R 30x R 75# 3x10 R 75#     Side-Stepping  5 laps GTB 5 laps  GTB 5 laps GTB 5 laps GTB 5 laps GTB 5 laps BTB 5 laps BTB     Monster Walks  5 laps GTB 5 laps  GTB 5 laps GTB 5 laps GTB 5 laps GTB 5 laps BTB 5 laps BTB     3-way hip kicks  15x GTB ea 15x  GTB ea 15x GTB 15x GTB 20x GTB 20x ea BTB 20x ea BTB     Ther Activity 9/25 10/2 10/4 10/7 10/11 10/14 10/16 10/24     Squatting    30x  30x 2x15 STS hi-lo 30x STS 30x STS     Lateral Step Ups    20x ea 1R 20x ea 1R  20x ea 2R 20x ea 2R     Step-Ups    20x ea 1R 20x ea 1R  20x ea 2R 20x ea 2R     Gait Training 9/25 10/2 10/4 10/7 10/11                                  Modalities 9/25 10/2 10/4 10/7 10/11

## 2024-10-25 ENCOUNTER — OFFICE VISIT (OUTPATIENT)
Dept: PHYSICAL THERAPY | Facility: CLINIC | Age: 69
End: 2024-10-25
Payer: MEDICARE

## 2024-10-25 DIAGNOSIS — M16.11 PRIMARY OSTEOARTHRITIS OF ONE HIP, RIGHT: Primary | ICD-10-CM

## 2024-10-25 PROCEDURE — 97530 THERAPEUTIC ACTIVITIES: CPT

## 2024-10-25 PROCEDURE — 97110 THERAPEUTIC EXERCISES: CPT

## 2024-10-25 NOTE — PROGRESS NOTES
"Daily Note     Today's date: 10/25/2024  Patient name: Sherri French  : 1955  MRN: [id number]  Referring provider: Ute Caro,*  Dx:   Encounter Diagnosis     ICD-10-CM    1. Primary osteoarthritis of one hip, right  M16.11           Start Time: 1120  Stop Time: 1200  Total time in clinic (min): 40 minutes    Subjective: Pt reports that her knees and LE musculature is sore coming int today's PT session due to increased challenge with progressed exercises over the last few visits.       Objective: See treatment diary below      Assessment: Pt tolerated treatment well. After warm up bike, started PT session with several stretches to attempt to decrease tension and pre session soreness noted in her knees and LE musculature. Pt responded well to stretching, with successful achievement of desired results post stretching. After, stretching all strengthening exercises were kept at the same intensity, due to pt reporting challenge with exercises and post session soreness after last visit. Pt was able to complete all sets and reps with proper form and appropriate levels of fatigue post session.  Patient exhibited good technique with therapeutic exercises and would benefit from continued PT      Plan: Continue per plan of care.  Progress treatment as tolerated.       Precautions: Falls Risk      Date 9/25 10/2 10/4 10/7 10/11 10/14 10/16 10/24 10/25    Visit # IE 2 3 4 5 6 7 8 9    FOTO IE     yes        Re-eval IE               Manuals 9/25 10/2 10/4 10/7 10/11 10/14 10/16 10/24 10/25    LE Stretching                                                    Neuro Re-Ed 9/25 10/2 10/4 10/7 10/11 10/14 101/6 10/24 10/25    TA activation 10x5\"            Bridges 15x3\" GTB 20x 3\" ball/belt 20x 3\" ball/belt   2x10       Clamshells 10x3\" ea GTB            Slantboard    5x20\"  5x20\"        Hamstring str  3x30\" R 3x30\" R 3x30\" seated R 3x30\" R    3x30\" ea    Slantboard         3x30\" ea    Standing Ball Crush  " "ea 30 ea.           Hamstring Curl             Birdbog  15x3\" ea           Deadbug   15x3\" ea          Split Squat             Ther Ex 9/25 10/2 10/4 10/7 10/11 10/14 10/6 10/24 10/25    Bike  6' L1 6' 6' 6' 8' 8' lvl 3 8' L3 8' lvl 3    Hip Flexor Str 3x30\" R        3x30\" ea     ITB Str         3x30\" ea     SLR 15x3\" ea     2x15       S/L Hip Abd 15x3\" ea     2x15       Prone hip ext      2x10       Side Plank             Pallof Press  15x3\" 10# 15x3\"  10# 15x3\" 12# 15x3\" 12#        Hamstring Curl       20x ea BTB 2x10 BTB 30x BTB    Marching       20x ea BTB 2x10 BTB 30x BTB    DL Leg Press  3x10 85# 3x10  85# 3x10 105#  3x10 105# 2x15 125# 30x 135# 3x10 135# 3x10 135#    SL Leg Press  3x10 45# R 3x10  45# R 3x10 55# R 3x10 55# R 2x15 65# R 30x R 75# 3x10 R 75# 3x10 R 75#    Side-Stepping  5 laps GTB 5 laps  GTB 5 laps GTB 5 laps GTB 5 laps GTB 5 laps BTB 5 laps BTB 5 laps BTB    Monster Walks  5 laps GTB 5 laps  GTB 5 laps GTB 5 laps GTB 5 laps GTB 5 laps BTB 5 laps BTB 5 laps BTB    3-way hip kicks  15x GTB ea 15x  GTB ea 15x GTB 15x GTB 20x GTB 20x ea BTB 20x ea BTB 20x ea BTB    Ther Activity 9/25 10/2 10/4 10/7 10/11 10/14 10/16 10/24     Squatting    30x  30x 2x15 STS hi-lo 30x STS 30x STS     Lateral Step Ups    20x ea 1R 20x ea 1R  20x ea 2R 20x ea 2R     Step-Ups    20x ea 1R 20x ea 1R  20x ea 2R 20x ea 2R     Gait Training 9/25 10/2 10/4 10/7 10/11                                  Modalities 9/25 10/2 10/4 10/7 10/11                                                  "

## 2024-10-30 ENCOUNTER — OFFICE VISIT (OUTPATIENT)
Dept: PHYSICAL THERAPY | Facility: CLINIC | Age: 69
End: 2024-10-30
Payer: MEDICARE

## 2024-10-30 DIAGNOSIS — M16.11 PRIMARY OSTEOARTHRITIS OF ONE HIP, RIGHT: Primary | ICD-10-CM

## 2024-10-30 PROCEDURE — 97110 THERAPEUTIC EXERCISES: CPT

## 2024-10-30 PROCEDURE — 97112 NEUROMUSCULAR REEDUCATION: CPT

## 2024-10-30 PROCEDURE — 97530 THERAPEUTIC ACTIVITIES: CPT

## 2024-10-30 NOTE — PROGRESS NOTES
"Daily Note     Today's date: 10/30/2024  Patient name: Sherri French  : 1955  MRN: [id number]  Referring provider: Ute Caro,*  Dx:   Encounter Diagnosis     ICD-10-CM    1. Primary osteoarthritis of one hip, right  M16.11           Start Time: 1220  Stop Time: 1300  Total time in clinic (min): 40 minutes    Subjective: Pt reports that she is having increased soreness again coming into today's session, but noted it just from doing her exercises. Pt reported that she is ready to be Dc'd with updated HEP next session.       Objective: See treatment diary below      Assessment: Pt tolerated treatment well. Remained at same intensity during today's session due to pt reporting soreness from current intensity of PT session. Progressed session by incorporating more strengthening exercises during today's session, to improve pts overall endurance and ensure that she is ready to be discharged with HEP next visit. Pt tolerated increased load of PT session well, with soreness noted post session, but no exacerbation of symptoms. Plan to discharge next PT session.  Patient exhibited good technique with therapeutic exercises and would benefit from continued PT      Plan: Continue per plan of care.  Progress treatment as tolerated.   Discharge next session 2024     Precautions: Falls Risk      Date 9/25 10/2 10/4 10/7 10/11 10/14 10/16 10/24 10/25 10/30   Visit # IE 2 3 4 5 6 7 8 9 10   FOTO IE     yes     DC   Re-eval IE               Manuals 9/25 10/2 10/4 10/7 10/11 10/14 10/16 10/24 10/25 10/30   LE Stretching                                                    Neuro Re-Ed 9/25 10/2 10/4 10/7 10/11 10/14 101/6 10/24 10/25 10/30   TA activation 10x5\"            Bridges 15x3\" GTB 20x 3\" ball/belt 20x 3\" ball/belt   2x10       Clamshells 10x3\" ea GTB            Slantboard    5x20\"  5x20\"        Hamstring str  3x30\" R 3x30\" R 3x30\" seated R 3x30\" R    3x30\" ea 3x30\" L   Slantboard         3x30\" ea 3x30\" ea " "  Standing Ball Crush March  30x ea 30 ea.        30x ea   Standing Ball Crush hamstring curl          30x ea   Hamstring Curl             Birdbog  15x3\" ea           Deadbug   15x3\" ea          Split Squat             Ther Ex 9/25 10/2 10/4 10/7 10/11 10/14 10/6 10/24 10/25 10/30   Bike  6' L1 6' 6' 6' 8' 8' lvl 3 8' L3 8' lvl 3 8' lvl 3   Hip Flexor Str 3x30\" R        3x30\" ea  3x30\" L   ITB Str         3x30\" ea  3x30\" L   SLR 15x3\" ea     2x15       S/L Hip Abd 15x3\" ea     2x15       Prone hip ext      2x10       Side Plank             Pallof Press  15x3\" 10# 15x3\"  10# 15x3\" 12# 15x3\" 12#        Hamstring Curl       20x ea BTB 2x10 BTB 30x BTB    Marching       20x ea BTB 2x10 BTB 30x BTB    DL Leg Press  3x10 85# 3x10  85# 3x10 105#  3x10 105# 2x15 125# 30x 135# 3x10 135# 3x10 135#    SL Leg Press  3x10 45# R 3x10  45# R 3x10 55# R 3x10 55# R 2x15 65# R 30x R 75# 3x10 R 75# 3x10 R 75#    Side-Stepping  5 laps GTB 5 laps  GTB 5 laps GTB 5 laps GTB 5 laps GTB 5 laps BTB 5 laps BTB 5 laps BTB 5 laps BTB   Monster Walks  5 laps GTB 5 laps  GTB 5 laps GTB 5 laps GTB 5 laps GTB 5 laps BTB 5 laps BTB 5 laps BTB 5 laps BTB   3-way hip kicks  15x GTB ea 15x  GTB ea 15x GTB 15x GTB 20x GTB 20x ea BTB 20x ea BTB 20x ea BTB 20x BTB   Ther Activity 9/25 10/2 10/4 10/7 10/11 10/14 10/16 10/24  10/30   Squatting    30x  30x 2x15 STS hi-lo 30x STS 30x STS  30x   Lateral Step Ups    20x ea 1R 20x ea 1R  20x ea 2R 20x ea 2R  20x ea 2R   Step-Ups    20x ea 1R 20x ea 1R  20x ea 2R 20x ea 2R  20x ea 2R   Gait Training 9/25 10/2 10/4 10/7 10/11                                  Modalities 9/25 10/2 10/4 10/7 10/11                                                    "

## 2024-11-01 ENCOUNTER — OFFICE VISIT (OUTPATIENT)
Dept: PHYSICAL THERAPY | Facility: CLINIC | Age: 69
End: 2024-11-01
Payer: MEDICARE

## 2024-11-01 DIAGNOSIS — M16.11 PRIMARY OSTEOARTHRITIS OF ONE HIP, RIGHT: Primary | ICD-10-CM

## 2024-11-01 PROCEDURE — 97110 THERAPEUTIC EXERCISES: CPT

## 2024-11-01 NOTE — PROGRESS NOTES
"Dischsrge Note     Today's date: 2024  Patient name: Sherri French  : 1955  MRN: [id number]  Referring provider: Ute Caro,*  Dx:   Encounter Diagnosis     ICD-10-CM    1. Primary osteoarthritis of one hip, right  M16.11           Start Time: 1148  Stop Time: 1203  Total time in clinic (min): 15 minutes    Subjective: Pt reports that she is feeling better overall, and reports that she is ready to be Dc'd from PT with updated HEP      Objective: See treatment diary below      Assessment: Pt was educated on updated HEP that was given to her to continue to perform at home to prevent decline in progress made at physical therapy. Pt was able to teach back and demonstrate proper from with all exercise given in today's session. Patient will no longer benefit from skilled PT.        Plan: Discharged from PT 2024     Precautions: Falls Risk      Date 11/1  10/4 10/7 10/11 10/14 10/16 10/24 10/25 10/30   Visit # 11  3 4 5 6 7 8 9 10   FOTO      yes     DC   Re-eval                Manuals 11/1  10/4 10/7 10/11 10/14 10/16 10/24 10/25 10/30   LE Stretching                                                    Neuro Re-Ed 11/1  10/4 10/7 10/11 10/14 101/6 10/24 10/25 10/30   TA activation             Bridges   20x 3\" ball/belt   2x10       Clamshells             Slantboard    5x20\"  5x20\"        Hamstring str   3x30\" R 3x30\" seated R 3x30\" R    3x30\" ea 3x30\" L   Slantboard         3x30\" ea 3x30\" ea   Standing Ball Crush  ea.        30x ea   Standing Ball Crush hamstring curl          30x ea   Hamstring Curl             Birdbog             Deadbug   15x3\" ea          Split Squat             Ther Ex 11/1  10/4 10/7 10/11 10/14 10/6 10/24 10/25 10/30   Bike 8'  L1 6' 6' 6' 8' 8' lvl 3 8' L3 8' lvl 3 8' lvl 3   HEP PWk            Pt education PWK            Hip Flexor Str         3x30\" ea  3x30\" L   ITB Str         3x30\" ea  3x30\" L   SLR      2x15       S/L Hip Abd      2x15       Prone hip ext " "     2x10       Side Plank             Pallof Press   15x3\"  10# 15x3\" 12# 15x3\" 12#        Hamstring Curl       20x ea BTB 2x10 BTB 30x BTB    Marching       20x ea BTB 2x10 BTB 30x BTB    DL Leg Press   3x10  85# 3x10 105#  3x10 105# 2x15 125# 30x 135# 3x10 135# 3x10 135#    SL Leg Press   3x10  45# R 3x10 55# R 3x10 55# R 2x15 65# R 30x R 75# 3x10 R 75# 3x10 R 75#    Side-Stepping   5 laps  GTB 5 laps GTB 5 laps GTB 5 laps GTB 5 laps BTB 5 laps BTB 5 laps BTB 5 laps BTB   Monster Walks   5 laps  GTB 5 laps GTB 5 laps GTB 5 laps GTB 5 laps BTB 5 laps BTB 5 laps BTB 5 laps BTB   3-way hip kicks   15x  GTB ea 15x GTB 15x GTB 20x GTB 20x ea BTB 20x ea BTB 20x ea BTB 20x BTB   Ther Activity   10/4 10/7 10/11 10/14 10/16 10/24  10/30   Squatting    30x  30x 2x15 STS hi-lo 30x STS 30x STS  30x   Lateral Step Ups    20x ea 1R 20x ea 1R  20x ea 2R 20x ea 2R  20x ea 2R   Step-Ups    20x ea 1R 20x ea 1R  20x ea 2R 20x ea 2R  20x ea 2R   Gait Training   10/4 10/7 10/11                                  Modalities   10/4 10/7 10/11                                                      "